# Patient Record
Sex: FEMALE | Race: WHITE | NOT HISPANIC OR LATINO | Employment: FULL TIME | URBAN - METROPOLITAN AREA
[De-identification: names, ages, dates, MRNs, and addresses within clinical notes are randomized per-mention and may not be internally consistent; named-entity substitution may affect disease eponyms.]

---

## 2022-03-02 ENCOUNTER — OFFICE VISIT (OUTPATIENT)
Dept: OBSTETRICS AND GYNECOLOGY | Facility: CLINIC | Age: 52
End: 2022-03-02
Payer: COMMERCIAL

## 2022-03-02 VITALS
BODY MASS INDEX: 26.49 KG/M2 | WEIGHT: 159 LBS | HEIGHT: 65 IN | HEART RATE: 94 BPM | SYSTOLIC BLOOD PRESSURE: 111 MMHG | DIASTOLIC BLOOD PRESSURE: 73 MMHG

## 2022-03-02 DIAGNOSIS — Z01.419 GYNECOLOGIC EXAM NORMAL: Primary | ICD-10-CM

## 2022-03-02 DIAGNOSIS — Z12.31 ENCOUNTER FOR SCREENING MAMMOGRAM FOR MALIGNANT NEOPLASM OF BREAST: ICD-10-CM

## 2022-03-02 PROCEDURE — 3074F SYST BP LT 130 MM HG: CPT | Mod: CPTII,S$GLB,, | Performed by: NURSE PRACTITIONER

## 2022-03-02 PROCEDURE — 3074F PR MOST RECENT SYSTOLIC BLOOD PRESSURE < 130 MM HG: ICD-10-PCS | Mod: CPTII,S$GLB,, | Performed by: NURSE PRACTITIONER

## 2022-03-02 PROCEDURE — 3078F PR MOST RECENT DIASTOLIC BLOOD PRESSURE < 80 MM HG: ICD-10-PCS | Mod: CPTII,S$GLB,, | Performed by: NURSE PRACTITIONER

## 2022-03-02 PROCEDURE — 3008F PR BODY MASS INDEX (BMI) DOCUMENTED: ICD-10-PCS | Mod: CPTII,S$GLB,, | Performed by: NURSE PRACTITIONER

## 2022-03-02 PROCEDURE — 1159F MED LIST DOCD IN RCRD: CPT | Mod: CPTII,S$GLB,, | Performed by: NURSE PRACTITIONER

## 2022-03-02 PROCEDURE — 1160F PR REVIEW ALL MEDS BY PRESCRIBER/CLIN PHARMACIST DOCUMENTED: ICD-10-PCS | Mod: CPTII,S$GLB,, | Performed by: NURSE PRACTITIONER

## 2022-03-02 PROCEDURE — 3078F DIAST BP <80 MM HG: CPT | Mod: CPTII,S$GLB,, | Performed by: NURSE PRACTITIONER

## 2022-03-02 PROCEDURE — 3008F BODY MASS INDEX DOCD: CPT | Mod: CPTII,S$GLB,, | Performed by: NURSE PRACTITIONER

## 2022-03-02 PROCEDURE — 1160F RVW MEDS BY RX/DR IN RCRD: CPT | Mod: CPTII,S$GLB,, | Performed by: NURSE PRACTITIONER

## 2022-03-02 PROCEDURE — 99386 PREV VISIT NEW AGE 40-64: CPT | Mod: S$GLB,,, | Performed by: NURSE PRACTITIONER

## 2022-03-02 PROCEDURE — 99386 PR PREVENTIVE VISIT,NEW,40-64: ICD-10-PCS | Mod: S$GLB,,, | Performed by: NURSE PRACTITIONER

## 2022-03-02 PROCEDURE — 1159F PR MEDICATION LIST DOCUMENTED IN MEDICAL RECORD: ICD-10-PCS | Mod: CPTII,S$GLB,, | Performed by: NURSE PRACTITIONER

## 2022-03-02 RX ORDER — CETIRIZINE HYDROCHLORIDE 10 MG/1
10 TABLET ORAL DAILY
COMMUNITY

## 2022-03-02 RX ORDER — ALENDRONATE SODIUM 70 MG/1
TABLET ORAL
COMMUNITY
Start: 2022-01-17

## 2022-03-02 RX ORDER — TRAZODONE HYDROCHLORIDE 100 MG/1
100 TABLET ORAL NIGHTLY
COMMUNITY
Start: 2022-01-04

## 2022-03-02 RX ORDER — BUPROPION HYDROCHLORIDE 300 MG/1
300 TABLET ORAL DAILY
COMMUNITY
Start: 2022-01-05

## 2022-03-02 RX ORDER — ROSUVASTATIN CALCIUM 5 MG/1
5 TABLET, COATED ORAL NIGHTLY
COMMUNITY
Start: 2022-01-05

## 2022-03-02 NOTE — PROGRESS NOTES
"  Subjective:       Patient ID: Cassandra Mario is a 51 y.o. female.    Chief Complaint:  Well Woman      History of Present Illness  HPI  Annual Exam-Postmenopausal  Patient presents for annual exam. The patient has no complaints today. The patient is sexually active. GYN screening history: last pap: was normal and last mammogram: was normal. The patient is not taking hormone replacement therapy. Patient denies post-menopausal vaginal bleeding. The patient wears seatbelts: yes.     Outpatient Medications Marked as Taking for the 3/2/22 encounter (Office Visit) with Elise Portillo NP   Medication Sig Dispense Refill    alendronate (FOSAMAX) 70 MG tablet TAKE 1 TABLET BY MOUTH ONCE A WEEK IN THE MORNING 30 MINUTES BEFORE EATING OR TAKING ANY OTHER MEDICATIONS. TAKE WITH 8 OZ OF WATER.      ascorbic acid (VITAMIN C ORAL) Take by mouth.      buPROPion (WELLBUTRIN XL) 300 MG 24 hr tablet Take 300 mg by mouth once daily.      cetirizine (ZYRTEC) 10 MG tablet Take 10 mg by mouth once daily.      ergocalciferol, vitamin D2, (VITAMIN D ORAL) Take by mouth. 5000IU      rosuvastatin (CRESTOR) 5 MG tablet Take 5 mg by mouth nightly.      traZODone (DESYREL) 100 MG tablet Take 100 mg by mouth nightly.       Vitals:    22 0838   BP: 111/73   Pulse: 94   Weight: 72.1 kg (159 lb)   Height: 5' 5" (1.651 m)     Past Medical History:   Diagnosis Date    Anxiety     Hyperlipidemia     Osteoporosis        Past Surgical History:   Procedure Laterality Date    APPENDECTOMY      BILATERAL SALPINGOOPHORECTOMY      KNEE SURGERY      LAPAROSCOPIC TOTAL HYSTERECTOMY           GYN & OB History  No LMP recorded. Patient has had a hysterectomy.   Date of Last Pap: No result found    OB History    Para Term  AB Living   2 2       2   SAB IAB Ectopic Multiple Live Births           2      # Outcome Date GA Lbr Leif/2nd Weight Sex Delivery Anes PTL Lv   2 Para      Vag-Spont   EPIFANIO   1 Para      Vag-Spont   EPIFANIO "       Review of Systems  Review of Systems   Constitutional: Negative for activity change, appetite change, chills, fatigue and fever.   HENT: Negative for nasal congestion and tinnitus.    Eyes: Negative for visual disturbance.   Respiratory: Negative for cough and shortness of breath.    Cardiovascular: Negative for chest pain and palpitations.   Gastrointestinal: Negative for abdominal pain, bloating, blood in stool, constipation, nausea and vomiting.   Endocrine: Negative for diabetes, hair loss and hot flashes.   Genitourinary: Negative for bladder incontinence, decreased libido, dysmenorrhea, dyspareunia, dysuria, flank pain, frequency, genital sores, hematuria, hot flashes, menorrhagia, menstrual problem, pelvic pain, urgency, vaginal bleeding, vaginal discharge, vaginal pain, urinary incontinence, postcoital bleeding, postmenopausal bleeding, vaginal dryness and vaginal odor.   Musculoskeletal: Negative for arthralgias, back pain, leg pain and myalgias.   Integumentary:  Negative for rash, acne, hair changes, mole/lesion, breast mass, nipple discharge, breast skin changes and breast tenderness.   Neurological: Negative for vertigo, syncope, numbness and headaches.   Hematological: Does not bruise/bleed easily.   Psychiatric/Behavioral: Negative for depression and sleep disturbance. The patient is not nervous/anxious.    Breast: Negative for asymmetry, lump, mass, mastodynia, nipple discharge, skin changes and tenderness          Objective:    Physical Exam:   Constitutional: She appears well-developed and well-nourished.    HENT:   Nose: No epistaxis.     Neck: No thyroid mass and no thyromegaly present.     Pulmonary/Chest: Effort normal and breath sounds normal. Chest wall is not dull to percussion. She exhibits no mass, no tenderness, no bony tenderness, no laceration, no crepitus, no edema, no deformity, no swelling and no retraction. Right breast exhibits no inverted nipple, no mass, no nipple  discharge, no skin change, no tenderness, presence, no bleeding and no swelling. Left breast exhibits no inverted nipple, no mass, no nipple discharge, no skin change, no tenderness, presence, no bleeding and no swelling. Breasts are symmetrical.        Abdominal: Soft. Bowel sounds are normal. She exhibits no distension. There is no abdominal tenderness. Hernia confirmed negative in the right inguinal area and confirmed negative in the left inguinal area.     Genitourinary:    Vagina and rectum normal.      Pelvic exam was performed with patient supine.   Labial bartholins normal.There is no rash, tenderness, lesion or injury on the right labia. There is no rash, tenderness, lesion or injury on the left labia. Right adnexum displays no mass, no tenderness and no fullness. Left adnexum displays no mass, no tenderness and no fullness. Vaginal cuff normal.  No erythema,  no vaginal discharge, tenderness, bleeding, rectocele, cystocele or unspecified prolapse of vaginal walls in the vagina.    No foreign body in the vagina.      No signs of injury in the vagina.   Cervix is absent.Uterus is absent.                Skin: Skin is warm and dry.    Psychiatric: She has a normal mood and affect. Her speech is normal and behavior is normal.          Assessment:        1. Gynecologic exam normal    2. Encounter for screening mammogram for malignant neoplasm of breast                Plan:      Gynecologic exam normal  -     Liquid-based pap smear, screening    Encounter for screening mammogram for malignant neoplasm of breast  -     Mammo Digital Screening Bilat w/ Kunal; Future; Expected date: 03/02/2022      Patient was counseled today on current ASCCP pap guidelines, the recommendation for yearly pelvic exams, healthy diet and exercise routines, annual mammograms, and breast self awareness. She is to see her PCP for other health maintenance.     No follow-ups on file.

## 2022-03-08 ENCOUNTER — PATIENT MESSAGE (OUTPATIENT)
Dept: OBSTETRICS AND GYNECOLOGY | Facility: CLINIC | Age: 52
End: 2022-03-08
Payer: COMMERCIAL

## 2025-08-04 ENCOUNTER — HOSPITAL ENCOUNTER (EMERGENCY)
Facility: HOSPITAL | Age: 55
Discharge: HOME | End: 2025-08-04
Attending: FAMILY MEDICINE
Payer: COMMERCIAL

## 2025-08-04 VITALS
SYSTOLIC BLOOD PRESSURE: 112 MMHG | TEMPERATURE: 99 F | OXYGEN SATURATION: 99 % | HEART RATE: 67 BPM | RESPIRATION RATE: 16 BRPM | BODY MASS INDEX: 23.9 KG/M2 | DIASTOLIC BLOOD PRESSURE: 59 MMHG | WEIGHT: 140 LBS | HEIGHT: 64 IN

## 2025-08-04 DIAGNOSIS — N20.0 NEPHROLITHIASIS: Primary | ICD-10-CM

## 2025-08-04 DIAGNOSIS — N13.30 HYDRONEPHROSIS, UNSPECIFIED HYDRONEPHROSIS TYPE: ICD-10-CM

## 2025-08-04 LAB
ALBUMIN SERPL-MCNC: 4.4 G/DL (ref 3.5–5)
ALBUMIN/GLOB SERPL: 1.7 RATIO (ref 1.1–2)
ALP SERPL-CCNC: 94 UNIT/L (ref 40–150)
ALT SERPL-CCNC: 6 UNIT/L (ref 0–55)
AMYLASE SERPL-CCNC: 50 UNIT/L (ref 25–125)
ANION GAP SERPL CALC-SCNC: 10 MEQ/L
AST SERPL-CCNC: 18 UNIT/L (ref 11–45)
BACTERIA #/AREA URNS AUTO: ABNORMAL /HPF
BASOPHILS # BLD AUTO: 0.05 X10(3)/MCL (ref 0.01–0.08)
BASOPHILS NFR BLD AUTO: 0.4 % (ref 0.1–1.2)
BILIRUB SERPL-MCNC: 0.5 MG/DL
BILIRUB UR QL STRIP.AUTO: NEGATIVE
BUN SERPL-MCNC: 23 MG/DL (ref 9.8–20.1)
CALCIUM SERPL-MCNC: 9.5 MG/DL (ref 8.4–10.2)
CAOX CRY UR QL COMP ASSIST: ABNORMAL
CHLORIDE SERPL-SCNC: 105 MMOL/L (ref 98–107)
CLARITY UR: CLEAR
CO2 SERPL-SCNC: 24 MMOL/L (ref 22–29)
COLOR UR AUTO: YELLOW
CREAT SERPL-MCNC: 0.95 MG/DL (ref 0.55–1.02)
CREAT/UREA NIT SERPL: 24
EOSINOPHIL # BLD AUTO: 0.07 X10(3)/MCL (ref 0.04–0.36)
EOSINOPHIL NFR BLD AUTO: 0.6 % (ref 0.7–7)
ERYTHROCYTE [DISTWIDTH] IN BLOOD BY AUTOMATED COUNT: 13.1 % (ref 11–14.5)
GFR SERPLBLD CREATININE-BSD FMLA CKD-EPI: 70 ML/MIN/1.73/M2
GLOBULIN SER-MCNC: 2.6 GM/DL (ref 2.4–3.5)
GLUCOSE SERPL-MCNC: 120 MG/DL (ref 74–100)
GLUCOSE UR QL STRIP: NEGATIVE
HCT VFR BLD AUTO: 41.9 % (ref 36–48)
HGB BLD-MCNC: 13.4 G/DL (ref 11.8–16)
HGB UR QL STRIP: ABNORMAL
IMM GRANULOCYTES # BLD AUTO: 0.03 X10(3)/MCL (ref 0–0.03)
IMM GRANULOCYTES NFR BLD AUTO: 0.3 % (ref 0–0.5)
KETONES UR QL STRIP: >=80
LEUKOCYTE ESTERASE UR QL STRIP: NEGATIVE
LIPASE SERPL-CCNC: 56 U/L
LYMPHOCYTES # BLD AUTO: 1.7 X10(3)/MCL (ref 1.16–3.74)
LYMPHOCYTES NFR BLD AUTO: 15 % (ref 20–55)
MAGNESIUM SERPL-MCNC: 1.9 MG/DL (ref 1.6–2.6)
MCH RBC QN AUTO: 27.7 PG (ref 27–34)
MCHC RBC AUTO-ENTMCNC: 32 G/DL (ref 31–37)
MCV RBC AUTO: 86.7 FL (ref 79–99)
MONOCYTES # BLD AUTO: 0.47 X10(3)/MCL (ref 0.24–0.36)
MONOCYTES NFR BLD AUTO: 4.1 % (ref 4.7–12.5)
NEUTROPHILS # BLD AUTO: 9.03 X10(3)/MCL (ref 1.56–6.13)
NEUTROPHILS NFR BLD AUTO: 79.6 % (ref 37–73)
NITRITE UR QL STRIP: NEGATIVE
NRBC BLD AUTO-RTO: 0 %
PH UR STRIP: 6 [PH]
PLATELET # BLD AUTO: 228 X10(3)/MCL (ref 140–371)
PMV BLD AUTO: 9.9 FL (ref 9.4–12.4)
POTASSIUM SERPL-SCNC: 4 MMOL/L (ref 3.5–5.1)
PROT SERPL-MCNC: 7 GM/DL (ref 6.4–8.3)
PROT UR QL STRIP: ABNORMAL
RBC # BLD AUTO: 4.83 X10(6)/MCL (ref 4–5.1)
RBC #/AREA URNS AUTO: ABNORMAL /HPF
SODIUM SERPL-SCNC: 139 MMOL/L (ref 136–145)
SP GR UR STRIP.AUTO: >=1.03 (ref 1–1.03)
SQUAMOUS #/AREA URNS AUTO: ABNORMAL /HPF
UROBILINOGEN UR STRIP-ACNC: 0.2
WBC # BLD AUTO: 11.35 X10(3)/MCL (ref 4–11.5)
WBC #/AREA URNS AUTO: ABNORMAL /HPF

## 2025-08-04 PROCEDURE — 85025 COMPLETE CBC W/AUTO DIFF WBC: CPT | Performed by: FAMILY MEDICINE

## 2025-08-04 PROCEDURE — 83690 ASSAY OF LIPASE: CPT | Performed by: FAMILY MEDICINE

## 2025-08-04 PROCEDURE — 25000003 PHARM REV CODE 250: Performed by: FAMILY MEDICINE

## 2025-08-04 PROCEDURE — 63600175 PHARM REV CODE 636 W HCPCS: Mod: JZ,TB | Performed by: FAMILY MEDICINE

## 2025-08-04 PROCEDURE — 81003 URINALYSIS AUTO W/O SCOPE: CPT | Performed by: FAMILY MEDICINE

## 2025-08-04 PROCEDURE — 96375 TX/PRO/DX INJ NEW DRUG ADDON: CPT

## 2025-08-04 PROCEDURE — 99285 EMERGENCY DEPT VISIT HI MDM: CPT | Mod: 25

## 2025-08-04 PROCEDURE — 96361 HYDRATE IV INFUSION ADD-ON: CPT

## 2025-08-04 PROCEDURE — 80053 COMPREHEN METABOLIC PANEL: CPT | Performed by: FAMILY MEDICINE

## 2025-08-04 PROCEDURE — 83735 ASSAY OF MAGNESIUM: CPT | Performed by: FAMILY MEDICINE

## 2025-08-04 PROCEDURE — 82150 ASSAY OF AMYLASE: CPT | Performed by: FAMILY MEDICINE

## 2025-08-04 PROCEDURE — 81015 MICROSCOPIC EXAM OF URINE: CPT | Mod: XB | Performed by: FAMILY MEDICINE

## 2025-08-04 PROCEDURE — 96374 THER/PROPH/DIAG INJ IV PUSH: CPT

## 2025-08-04 RX ORDER — KETOROLAC TROMETHAMINE 30 MG/ML
30 INJECTION, SOLUTION INTRAMUSCULAR; INTRAVENOUS
Status: COMPLETED | OUTPATIENT
Start: 2025-08-04 | End: 2025-08-04

## 2025-08-04 RX ORDER — HYDROMORPHONE HYDROCHLORIDE 1 MG/ML
0.5 INJECTION, SOLUTION INTRAMUSCULAR; INTRAVENOUS; SUBCUTANEOUS
Refills: 0 | Status: COMPLETED | OUTPATIENT
Start: 2025-08-04 | End: 2025-08-04

## 2025-08-04 RX ORDER — HYDROMORPHONE HYDROCHLORIDE 1 MG/ML
1 INJECTION, SOLUTION INTRAMUSCULAR; INTRAVENOUS; SUBCUTANEOUS
Refills: 0 | Status: COMPLETED | OUTPATIENT
Start: 2025-08-04 | End: 2025-08-04

## 2025-08-04 RX ORDER — ONDANSETRON HYDROCHLORIDE 2 MG/ML
4 INJECTION, SOLUTION INTRAVENOUS
Status: COMPLETED | OUTPATIENT
Start: 2025-08-04 | End: 2025-08-04

## 2025-08-04 RX ORDER — OXYCODONE AND ACETAMINOPHEN 5; 325 MG/1; MG/1
1 TABLET ORAL EVERY 6 HOURS PRN
Qty: 12 TABLET | Refills: 0 | Status: SHIPPED | OUTPATIENT
Start: 2025-08-04

## 2025-08-04 RX ORDER — FENTANYL CITRATE 50 UG/ML
50 INJECTION, SOLUTION INTRAMUSCULAR; INTRAVENOUS
Refills: 0 | Status: COMPLETED | OUTPATIENT
Start: 2025-08-04 | End: 2025-08-04

## 2025-08-04 RX ADMIN — FENTANYL CITRATE 50 MCG: 50 INJECTION, SOLUTION INTRAMUSCULAR; INTRAVENOUS at 04:08

## 2025-08-04 RX ADMIN — ONDANSETRON 4 MG: 2 INJECTION INTRAMUSCULAR; INTRAVENOUS at 03:08

## 2025-08-04 RX ADMIN — HYDROMORPHONE HYDROCHLORIDE 0.5 MG: 1 INJECTION, SOLUTION INTRAMUSCULAR; INTRAVENOUS; SUBCUTANEOUS at 04:08

## 2025-08-04 RX ADMIN — KETOROLAC TROMETHAMINE 30 MG: 30 INJECTION, SOLUTION INTRAMUSCULAR at 04:08

## 2025-08-04 RX ADMIN — HYDROMORPHONE HYDROCHLORIDE 1 MG: 1 INJECTION, SOLUTION INTRAMUSCULAR; INTRAVENOUS; SUBCUTANEOUS at 03:08

## 2025-08-04 RX ADMIN — SODIUM CHLORIDE 1000 ML: 9 INJECTION, SOLUTION INTRAVENOUS at 03:08

## 2025-08-04 NOTE — ED PROVIDER NOTES
Encounter Date: 8/4/2025       History     Chief Complaint   Patient presents with    Flank Pain     C/O RLQ FLANK PAIN STARTING AT 1800 YESTERDAY     Patient presents for evaluation of left-sided flank pain.  Patient notes having left-sided flank pain for the past several hours.  Pain is sharp and severe rate radiates from her left flank to left pelvic area.  Pain is identical to previous episode with patient had kidney stone.  Patient denies having hematuria dysuria increased urinary frequency denies hematochezia melena hematemesis.  Patient does note having some nausea with her pain patient denies having any aggravating or relieving features at present.    The history is provided by the patient.     Review of patient's allergies indicates:  No Known Allergies  Past Medical History:   Diagnosis Date    Anxiety     Hyperlipidemia     Osteoporosis      Past Surgical History:   Procedure Laterality Date    APPENDECTOMY      BILATERAL SALPINGOOPHORECTOMY      KNEE SURGERY      LAPAROSCOPIC TOTAL HYSTERECTOMY       Family History   Problem Relation Name Age of Onset    Breast cancer Mother  70    Breast cancer Maternal Aunt  66    Multiple myeloma Maternal Aunt      Colon cancer Neg Hx      Ovarian cancer Neg Hx      Uterine cancer Neg Hx      Melanoma Neg Hx      Pancreatic cancer Neg Hx      Prostate cancer Neg Hx       Social History[1]  Review of Systems   Constitutional: Negative.    HENT: Negative.     Eyes: Negative.    Respiratory: Negative.     Cardiovascular: Negative.    Gastrointestinal:  Positive for abdominal pain, nausea and vomiting.   Endocrine: Negative.    Genitourinary:  Positive for flank pain.   Skin: Negative.    Allergic/Immunologic: Negative.    Neurological: Negative.    Hematological: Negative.    Psychiatric/Behavioral: Negative.         Physical Exam     Initial Vitals [08/04/25 0317]   BP Pulse Resp Temp SpO2   (!) 144/77 74 18 97.2 °F (36.2 °C) 99 %      MAP       --         Physical  Exam    Vitals reviewed.  Constitutional: She appears well-developed and well-nourished. She is not diaphoretic. No distress.   HENT:   Head: Normocephalic and atraumatic. Mouth/Throat: No oropharyngeal exudate.   Eyes: EOM are normal. Pupils are equal, round, and reactive to light.   Neck: Neck supple. No thyromegaly present. No tracheal deviation present.   Normal range of motion.  Cardiovascular:  Normal rate, regular rhythm and normal heart sounds.           No murmur heard.  Pulmonary/Chest: Breath sounds normal. No stridor. No respiratory distress. She has no wheezes.   Abdominal: Abdomen is soft. Bowel sounds are normal. She exhibits no distension. There is no abdominal tenderness. There is no rebound.   Musculoskeletal:         General: No tenderness or edema. Normal range of motion.      Cervical back: Normal range of motion and neck supple.     Neurological: She is alert and oriented to person, place, and time. She has normal reflexes. No cranial nerve deficit. GCS score is 15. GCS eye subscore is 4. GCS verbal subscore is 5. GCS motor subscore is 6.   Skin: Skin is warm.   Psychiatric: She has a normal mood and affect.         ED Course   Procedures  Labs Reviewed   URINALYSIS, REFLEX TO URINE CULTURE - Abnormal       Result Value    Color, UA Yellow      Appearance, UA Clear      Specific Gravity, UA >=1.030      pH, UA 6.0      Protein, UA Trace (*)     Glucose, UA Negative      Ketones, UA >=80 (*)     Blood, UA Large (*)     Bilirubin, UA Negative      Urobilinogen, UA 0.2      Nitrites, UA Negative      Leukocyte Esterase, UA Negative      Narrative:      URINE STABILITY IS 2 HOURS AT ROOM TEMP OR    SIX HOURS REFRIGERATED. PERFORMING TESTING ON    SPECIMENS GREATER THAN THIS AGE MAY AFFECT THE    FOLLOWING TESTS:    PH          SPECIFIC GRAVITY           BLOOD    CLARITY     BILIRUBIN               UROBILINOGEN   COMPREHENSIVE METABOLIC PANEL - Abnormal    Sodium 139      Potassium 4.0      Chloride  105      CO2 24      Glucose 120 (*)     Blood Urea Nitrogen 23 (*)     Creatinine 0.95      Calcium 9.5      Protein Total 7.0      Albumin 4.4      Globulin 2.6      Albumin/Globulin Ratio 1.7      Bilirubin Total 0.5      ALP 94      ALT 6      AST 18      eGFR 70      Anion Gap 10.0      BUN/Creatinine Ratio 24     CBC WITH DIFFERENTIAL - Abnormal    WBC 11.35      RBC 4.83      Hgb 13.4      Hct 41.9      MCV 86.7      MCH 27.7      MCHC 32.0      RDW 13.1      Platelet 228      MPV 9.9      Neut % 79.6 (*)     Lymph % 15.0 (*)     Mono % 4.1 (*)     Eos % 0.6 (*)     Basophil % 0.4      Lymph # 1.70      Neut # 9.03 (*)     Mono # 0.47 (*)     Eos # 0.07      Baso # 0.05      Imm Gran # 0.03      Imm Grans % 0.3      NRBC% 0.0     URINALYSIS, MICROSCOPIC - Abnormal    Bacteria, UA Few (*)     Calcium Oxalate Crystals, UA Many (*)     RBC, UA 3-5      WBC, UA 0-5      Squamous Epithelial Cells, UA Moderate (*)    MAGNESIUM - Normal    Magnesium Level 1.90     AMYLASE - Normal    Amylase Level 50     LIPASE - Normal    Lipase Level 56     CBC W/ AUTO DIFFERENTIAL    Narrative:     The following orders were created for panel order CBC auto differential.  Procedure                               Abnormality         Status                     ---------                               -----------         ------                     CBC with Differential[[id number]]       Abnormal            Final result                 Please view results for these tests on the individual orders.          Imaging Results              CT Abdomen Pelvis  Without Contrast (Preliminary result)  Result time 08/04/25 05:11:42      Preliminary result by David Dennis MD (08/04/25 05:11:42)                   Narrative:    START OF REPORT:  Technique: CT of the abdomen and pelvis was performed with axial images as well as sagittal and coronal reconstruction images without intravenous contrast.    Comparison: None available.    Clinical  History: Flank pain r/o kidney stone.    Dosage Information: Automated Exposure Control was utilized.    Findings:  Lines and Tubes: None.  Thorax:  Lungs: The visualized lung bases appear unremarkable. No focal infiltrate or consolidation is seen.  Pleura: No effusions or thickening. No pneumothorax is seen.  Heart: The heart size is within normal limits.  Abdomen:  Abdominal Wall: No abdominal wall pathology is seen.  Liver: The liver appears unremarkable.  Biliary System: No intrahepatic or extrahepatic biliary duct dilatation is seen.  Gallbladder: The gallbladder appears unremarkable with no stones wall thickening or pericholecystic inflammatory changes or fluid.  Pancreas: The pancreas appears unremarkable. No pancreatic mass, or, ductal dilatation is seen.  Spleen: The spleen appears unremarkable.  Adrenals: The adrenal glands appear unremarkable.  Kidneys: The right kidney appears unremarkable with no stones cysts masses or hydronephrosis. A single cyst measuring 2.5 cm is seen on Image 27, Series 3 in the mid pole of the left kidney. There is moderate left hydronephrosis due to a 7 mm obstructing stone in the left distal ureter as seen on Series 3 Image 69. The left kidney is edematous with moderate perinephric fat stranding such that an element of acute pyelonephritis is considered.  Aorta: There is minimal calcification of the abdominal aorta and its branches.  IVC: Unremarkable.  Bowel:  Esophagus: The visualized distal esophagus appears unremarkable.  Stomach: The stomach appears unremarkable.  Duodenum: Unremarkable appearing duodenum.  Small Bowel: The small bowel appears unremarkable.  Colon: Nondistended.  Appendix: The appendix appears unremarkable and is partially seen on Image 62, Series 3.  Peritoneum: No intraperitoneal free air or ascites is seen.    Pelvis:  Bladder: The bladder is nondistended and cannot be definitively evaluated.  Female:  Uterus: The uterus is not identified consistent  with history of hysterectomy.  Ovaries: No adnexal masses are seen.    Bony structures:  Dorsal Spine: There is mild spondylosis of the visualized dorsal spine.  Bony Pelvis: The visualized bony structures of the pelvis appear unremarkable.      Impression:  1. There is moderate left hydronephrosis due to a 7 mm obstructing stone in the left distal ureter as seen on Series 3 Image 69. The left kidney is edematous with moderate perinephric fat stranding such that an element of acute pyelonephritis is considered. Correlate clinically as regards further evaluation and followup.  2. Details and other findings as discussed above.                                         Medications   HYDROmorphone injection 1 mg (1 mg Intravenous Given 8/4/25 0346)   ondansetron injection 4 mg (4 mg Intravenous Given 8/4/25 0346)   sodium chloride 0.9% bolus 1,000 mL 1,000 mL (1,000 mLs Intravenous New Bag 8/4/25 0348)   ketorolac injection 30 mg (30 mg Intravenous Given 8/4/25 0412)   HYDROmorphone injection 0.5 mg (0.5 mg Intravenous Given 8/4/25 0412)   fentaNYL injection 50 mcg (50 mcg Intravenous Given 8/4/25 0443)     Medical Decision Making  Patient was 7 mm kidney stone with hydronephrosis.  Discussed options with patient.  Patient to follow up as an outpatient with Urology.  We will attempt to passed stone .  On her own.  No signs of infected stone at present.  Patient significantly symptomatically improved at the time of discharge.    Amount and/or Complexity of Data Reviewed  Labs: ordered.  Radiology: ordered.    Risk  Prescription drug management.                                          Clinical Impression:  Final diagnoses:  [N20.0] Nephrolithiasis (Primary)  [N13.30] Hydronephrosis, unspecified hydronephrosis type          ED Disposition Condition    Discharge Stable          ED Prescriptions       Medication Sig Dispense Start Date End Date Auth. Provider    oxyCODONE-acetaminophen (PERCOCET) 5-325 mg per tablet Take 1  tablet by mouth every 6 (six) hours as needed for Pain. 12 tablet 8/4/2025 -- Carlos Eduardo Zimmerman MD          Follow-up Information    None                [1]   Social History  Tobacco Use    Smoking status: Never    Smokeless tobacco: Never   Substance Use Topics    Alcohol use: Never    Drug use: Never        Carlos Eduardo Zimmerman MD  08/04/25 0513

## 2025-08-05 ENCOUNTER — TELEPHONE (OUTPATIENT)
Dept: UROLOGY | Facility: CLINIC | Age: 55
End: 2025-08-05
Payer: COMMERCIAL

## 2025-08-05 NOTE — TELEPHONE ENCOUNTER
Pt calling for an ton regarding a kidney stone. Pt reported to er on Sunday and had CT done. Pt has a 7 mm stone in her ureter. I have scheduled pt for tomorrow.    Copied from CRM #[id number]. Topic: Appointments - Appointment Scheduling  >> Aug 5, 2025 10:39 AM Stephanie wrote:  Type: Appointment Access (office must schedule)    Who Called: Cassandra  Reason for Visit:  Kidney stones   When does the patient need to be seen?:   Next Available Appointment:   Would the patient rather a call back or a response via [facility]?  call back  Best Call Back Number:  [phone]  Additional Information:  Patient went to the ER Sunday evening and was told to make appt w/Dr. Carver for kidney stones. Please call back [phone].

## 2025-08-06 ENCOUNTER — OFFICE VISIT (OUTPATIENT)
Dept: UROLOGY | Facility: CLINIC | Age: 55
End: 2025-08-06
Payer: COMMERCIAL

## 2025-08-06 ENCOUNTER — HOSPITAL ENCOUNTER (OUTPATIENT)
Dept: RADIOLOGY | Facility: CLINIC | Age: 55
Discharge: HOME | End: 2025-08-06
Attending: FAMILY MEDICINE
Payer: COMMERCIAL

## 2025-08-06 VITALS — HEIGHT: 64 IN | BODY MASS INDEX: 23.9 KG/M2 | WEIGHT: 140 LBS

## 2025-08-06 DIAGNOSIS — N20.1 URETERAL STONE: ICD-10-CM

## 2025-08-06 DIAGNOSIS — N20.1 URETERAL STONE: Primary | ICD-10-CM

## 2025-08-06 LAB
BILIRUBIN, UA POC OHS: NEGATIVE
BLOOD, UA POC OHS: ABNORMAL
CLARITY, UA POC OHS: CLEAR
COLOR, UA POC OHS: YELLOW
GLUCOSE, UA POC OHS: NEGATIVE
KETONES, UA POC OHS: NEGATIVE
LEUKOCYTES, UA POC OHS: ABNORMAL
NITRITE, UA POC OHS: NEGATIVE
PH, UA POC OHS: 6
PROTEIN, UA POC OHS: 30
SPECIFIC GRAVITY, UA POC OHS: 1.02
UROBILINOGEN, UA POC OHS: 0.2

## 2025-08-06 PROCEDURE — 1159F MED LIST DOCD IN RCRD: CPT | Mod: CPTII,,, | Performed by: FAMILY MEDICINE

## 2025-08-06 PROCEDURE — 3008F BODY MASS INDEX DOCD: CPT | Mod: CPTII,,, | Performed by: FAMILY MEDICINE

## 2025-08-06 PROCEDURE — 99205 OFFICE O/P NEW HI 60 MIN: CPT | Mod: S$PBB,,, | Performed by: FAMILY MEDICINE

## 2025-08-06 RX ORDER — LEVOTHYROXINE, LIOTHYRONINE 19; 4.5 UG/1; UG/1
TABLET ORAL
COMMUNITY
Start: 2025-07-25

## 2025-08-06 RX ORDER — ESTRADIOL 1 MG/1
1 TABLET ORAL NIGHTLY
COMMUNITY
Start: 2025-06-27

## 2025-08-06 RX ORDER — PROGESTERONE 100 MG/1
100 CAPSULE ORAL
COMMUNITY
Start: 2025-06-27

## 2025-08-06 NOTE — PROGRESS NOTES
Subjective:       Patient ID: Cassandra Mario is a 54 y.o. female.    Chief Complaint: No chief complaint on file.      HPI: 54-year-old female patient presenting to the clinic to establish care.  Patient has a history of kidney stone.  She recently started experiencing severe left flank pain and report to the ER.  CT done noting a 7 mm distal ureteral stone.  Patient still experiencing pain and here to establish care and discuss surgical intervention         Past Medical History:   Past Medical History:   Diagnosis Date    Anxiety     Hyperlipidemia     Kidney stone     Osteoporosis        Past Surgical Historical:   Past Surgical History:   Procedure Laterality Date    APPENDECTOMY      BILATERAL SALPINGOOPHORECTOMY      KNEE SURGERY      LAPAROSCOPIC TOTAL HYSTERECTOMY          Medications:   Medication List with Changes/Refills   Current Medications    ALENDRONATE (FOSAMAX) 70 MG TABLET    TAKE 1 TABLET BY MOUTH ONCE A WEEK IN THE MORNING 30 MINUTES BEFORE EATING OR TAKING ANY OTHER MEDICATIONS. TAKE WITH 8 OZ OF WATER.    ASCORBIC ACID (VITAMIN C ORAL)    Take by mouth.    BUPROPION (WELLBUTRIN XL) 300 MG 24 HR TABLET    Take 300 mg by mouth once daily.    CETIRIZINE (ZYRTEC) 10 MG TABLET    Take 10 mg by mouth once daily.    ERGOCALCIFEROL, VITAMIN D2, (VITAMIN D ORAL)    Take by mouth. 5000IU    ESTRADIOL (ESTRACE) 1 MG TABLET    Take 1 mg by mouth every evening.    NP THYROID 30 MG TAB    Take by mouth.    OXYCODONE-ACETAMINOPHEN (PERCOCET) 5-325 MG PER TABLET    Take 1 tablet by mouth every 6 (six) hours as needed for Pain.    PROGESTERONE (PROMETRIUM) 100 MG CAPSULE    Take 100 mg by mouth.    ROSUVASTATIN (CRESTOR) 5 MG TABLET    Take 5 mg by mouth nightly.    TRAZODONE (DESYREL) 100 MG TABLET    Take 100 mg by mouth nightly.        Past Social History: Social History[1]    Allergies: Review of patient's allergies indicates:  No Known Allergies     Family History:   Family History   Problem Relation Name Age  of Onset    Cancer Mother          Breast    Breast cancer Mother  70    Breast cancer Maternal Aunt  66    Multiple myeloma Maternal Aunt      Colon cancer Neg Hx      Ovarian cancer Neg Hx      Uterine cancer Neg Hx      Melanoma Neg Hx      Pancreatic cancer Neg Hx      Prostate cancer Neg Hx          Review of Systems:  Review of Systems   Constitutional:  Negative for activity change, appetite change, chills, diaphoresis, fatigue, fever and unexpected weight change.   HENT:  Negative for congestion, dental problem, drooling, ear discharge, ear pain, facial swelling, hearing loss, mouth sores, nosebleeds, postnasal drip, rhinorrhea, sinus pressure, sinus pain, sneezing, sore throat, tinnitus, trouble swallowing and voice change.    Eyes:  Negative for photophobia, pain, discharge, redness, itching and visual disturbance.   Respiratory:  Negative for apnea, cough, choking, chest tightness, shortness of breath, wheezing and stridor.    Cardiovascular:  Negative for chest pain and leg swelling.   Gastrointestinal:  Negative for abdominal distention, abdominal pain, anal bleeding, blood in stool, constipation, diarrhea, nausea, rectal pain and vomiting.   Endocrine: Negative for cold intolerance, heat intolerance, polydipsia, polyphagia and polyuria.   Genitourinary:  Positive for flank pain. Negative for decreased urine volume, difficulty urinating, dyspareunia, dysuria, enuresis, frequency, genital sores, hematuria, menstrual problem, pelvic pain, urgency, vaginal bleeding, vaginal discharge and vaginal pain.   Musculoskeletal:  Negative for arthralgias, back pain, gait problem, joint swelling, myalgias, neck pain and neck stiffness.   Skin:  Negative for color change, pallor, rash and wound.   Allergic/Immunologic: Negative for environmental allergies, food allergies and immunocompromised state.   Neurological:  Negative for dizziness, tremors, seizures, syncope, facial asymmetry, speech difficulty, weakness,  light-headedness, numbness and headaches.   Hematological:  Negative for adenopathy. Does not bruise/bleed easily.   Psychiatric/Behavioral:  Negative for agitation, behavioral problems, confusion, decreased concentration, dysphoric mood, hallucinations, self-injury, sleep disturbance and suicidal ideas. The patient is not nervous/anxious and is not hyperactive.        Physical Exam:  Physical Exam  Constitutional:       Appearance: Normal appearance. She is normal weight.   HENT:      Head: Normocephalic.   Cardiovascular:      Rate and Rhythm: Normal rate.   Pulmonary:      Effort: Pulmonary effort is normal.   Skin:     General: Skin is dry.   Neurological:      General: No focal deficit present.      Mental Status: She is alert.   Psychiatric:         Mood and Affect: Mood normal.         Behavior: Behavior normal.         Thought Content: Thought content normal.         Assessment/Plan:     Kidney stone:  Personally reviewed CT scan.  7 mm distal ureteral.  We will set the patient up for left ureteroscopy at the next available date with Dr. Carver.  Report to [facility] ER for any worsening symptoms or development of fever prior to operation.  Problem List Items Addressed This Visit    None  Visit Diagnoses         Ureteral stone    -  Primary                    [1]   Social History  Socioeconomic History    Marital status: Unknown   Tobacco Use    Smoking status: Never    Smokeless tobacco: Never   Substance and Sexual Activity    Alcohol use: Never    Drug use: Never    Sexual activity: Yes     Birth control/protection: See Surgical Hx     Comment: Hysterectomy

## 2025-08-08 ENCOUNTER — OUTSIDE PLACE OF SERVICE (OUTPATIENT)
Dept: UROLOGY | Facility: CLINIC | Age: 55
End: 2025-08-08

## 2025-08-08 DIAGNOSIS — N20.1 URETERAL STONE: Primary | ICD-10-CM

## 2025-08-08 RX ORDER — CIPROFLOXACIN 500 MG/1
500 TABLET, FILM COATED ORAL EVERY 12 HOURS
Qty: 6 TABLET | Refills: 0 | Status: SHIPPED | OUTPATIENT
Start: 2025-08-08 | End: 2025-08-11

## 2025-08-08 RX ORDER — HYDROCODONE BITARTRATE AND ACETAMINOPHEN 10; 325 MG/1; MG/1
1 TABLET ORAL EVERY 6 HOURS PRN
Qty: 12 TABLET | Refills: 0 | Status: SHIPPED | OUTPATIENT
Start: 2025-08-08

## 2025-08-28 ENCOUNTER — PROCEDURE VISIT (OUTPATIENT)
Dept: UROLOGY | Facility: CLINIC | Age: 55
End: 2025-08-28
Payer: COMMERCIAL

## 2025-08-28 VITALS
DIASTOLIC BLOOD PRESSURE: 73 MMHG | HEIGHT: 65 IN | BODY MASS INDEX: 23.82 KG/M2 | WEIGHT: 143 LBS | SYSTOLIC BLOOD PRESSURE: 111 MMHG | HEART RATE: 82 BPM

## 2025-08-28 DIAGNOSIS — N20.1 URETERAL STONE: ICD-10-CM
